# Patient Record
Sex: FEMALE | Race: WHITE | NOT HISPANIC OR LATINO | Employment: PART TIME | URBAN - METROPOLITAN AREA
[De-identification: names, ages, dates, MRNs, and addresses within clinical notes are randomized per-mention and may not be internally consistent; named-entity substitution may affect disease eponyms.]

---

## 2021-06-21 ENCOUNTER — HOSPITAL ENCOUNTER (EMERGENCY)
Facility: HOSPITAL | Age: 21
Discharge: HOME | End: 2021-06-21
Attending: EMERGENCY MEDICINE | Admitting: EMERGENCY MEDICINE

## 2021-06-21 ENCOUNTER — APPOINTMENT (OUTPATIENT)
Dept: GENERAL RADIOLOGY | Facility: HOSPITAL | Age: 21
End: 2021-06-21

## 2021-06-21 VITALS
HEART RATE: 99 BPM | DIASTOLIC BLOOD PRESSURE: 78 MMHG | TEMPERATURE: 98.1 F | HEIGHT: 62 IN | WEIGHT: 190.26 LBS | OXYGEN SATURATION: 99 % | SYSTOLIC BLOOD PRESSURE: 117 MMHG | BODY MASS INDEX: 35.01 KG/M2 | RESPIRATION RATE: 16 BRPM

## 2021-06-21 DIAGNOSIS — S61.209A AVULSION OF FINGER, INITIAL ENCOUNTER: Primary | ICD-10-CM

## 2021-06-21 PROCEDURE — 73140 X-RAY EXAM OF FINGER(S): CPT

## 2021-06-21 PROCEDURE — 99283 EMERGENCY DEPT VISIT LOW MDM: CPT

## 2021-06-21 PROCEDURE — 96372 THER/PROPH/DIAG INJ SC/IM: CPT

## 2021-06-21 PROCEDURE — 25010000003 CEFAZOLIN PER 500 MG: Performed by: EMERGENCY MEDICINE

## 2021-06-21 RX ORDER — CEFAZOLIN SODIUM 1 G/3ML
1 INJECTION, POWDER, FOR SOLUTION INTRAMUSCULAR; INTRAVENOUS ONCE
Status: COMPLETED | OUTPATIENT
Start: 2021-06-21 | End: 2021-06-21

## 2021-06-21 RX ORDER — CEFADROXIL 500 MG/1
500 CAPSULE ORAL 2 TIMES DAILY
Qty: 20 CAPSULE | Refills: 0 | Status: SHIPPED | OUTPATIENT
Start: 2021-06-21

## 2021-06-21 RX ORDER — WATER 1000 ML/1000ML
INJECTION, SOLUTION INTRAVENOUS
Status: COMPLETED
Start: 2021-06-21 | End: 2021-06-21

## 2021-06-21 RX ORDER — HYDROCODONE BITARTRATE AND ACETAMINOPHEN 5; 325 MG/1; MG/1
1 TABLET ORAL EVERY 6 HOURS PRN
Qty: 12 TABLET | Refills: 0 | Status: SHIPPED | OUTPATIENT
Start: 2021-06-21

## 2021-06-21 RX ORDER — HYDROCODONE BITARTRATE AND ACETAMINOPHEN 5; 325 MG/1; MG/1
1 TABLET ORAL ONCE AS NEEDED
Status: DISCONTINUED | OUTPATIENT
Start: 2021-06-21 | End: 2021-06-21 | Stop reason: HOSPADM

## 2021-06-21 RX ADMIN — CEFAZOLIN SODIUM 1 G: 1 INJECTION, POWDER, FOR SOLUTION INTRAMUSCULAR; INTRAVENOUS at 10:50

## 2021-06-21 RX ADMIN — WATER: 1 INJECTION INTRAMUSCULAR; INTRAVENOUS; SUBCUTANEOUS at 10:52

## 2021-06-21 NOTE — ED PROVIDER NOTES
Subjective   Chief complaint: Patient is a pleasant 20-year-old female.  She cannot remember her last tetanus.  She states that she was working at a [employer] company.  She had her hand prepared to  a piece of wood.  Somebody else was trying to pick it up as well.  They pushed it forward.  Smashed her finger between another piece of lumber and the one that she was trying to .  I tore skin from her finger.  She presented here.  She states that she is in some mild discomfort.  She states where her finger is severed is numb.  Otherwise she has been able to move it appropriately.  She is right-handed    Context: As above    Duration: 45 minutes prior to arrival    Timing: Sudden onset    Severity: Pain is not severe currently    Associated Symptoms: Negative except as noted above.          PCP:  LMP:          Review of Systems   Musculoskeletal:        Finger injury     Skin: Positive for wound.   Neurological: Positive for numbness.       No past medical history on file.    No Known Allergies    No past surgical history on file.    No family history on file.             Objective   Physical Exam  Vitals and nursing note reviewed.   Constitutional:       Appearance: Normal appearance.   HENT:      Head: Normocephalic and atraumatic.      Mouth/Throat:      Mouth: Mucous membranes are moist.   Eyes:      Extraocular Movements: Extraocular movements intact.   Cardiovascular:      Rate and Rhythm: Normal rate and regular rhythm.   Musculoskeletal:      Cervical back: Normal range of motion.      Comments: See scanned report.  Patient has amputated the distal medial aspect of her fifth digit.  She has bone exposed.  She can flex and extend appropriately.  Area of injury is 9.  The remainder of the finger is neurovascularly intact.   Skin:     Capillary Refill: Capillary refill takes less than 2 seconds.      Comments: The left medial aspect of the distal fifth digit is avulsed and amputated.  Bone is exposed.   Patient can flex and extend fully the finger.  Remainder fingers neurovascularly intact.   Neurological:      General: No focal deficit present.      Mental Status: She is alert.   Psychiatric:         Mood and Affect: Mood normal.         Thought Content: Thought content normal.         Procedures           ED Course  ED Course as of Jun 21 1221   Mon Jun 21, 2021   1220 Inspect report was run    [LH]      ED Course User Index  [LH] Aram Sanchez,           XR Finger 2+ View Left    Result Date: 6/21/2021   1. No acute fracture or dislocation. 2. Large area of soft tissue avulsion of the tip of the left pinky finger with exposure of the tuft of the distal phalanx.  Electronically Signed By-Juan Antonio Del Toro MD On:6/21/2021 9:45 AM This report was finalized on [id number] by  Juan Antonio Del Toro MD.                                      MDM  Number of Diagnoses or Management Options  Avulsion of finger, initial encounter  Diagnosis management comments: Patient had crush injury avulsion to her finger.  I discussed the case with  and he states that the amputation portion cannot be spared.  Patient will follow up in the office.  He would prefer to manage nonoperatively.  Patient was irrigated and cleansed.  Digital block was done to anesthetize the finger for cleansing.  Copious cleansing per staff with sterile saline and wound cleanser.  Patient had persistent bleeding and oozing.  Surgicel had to be applied.  Patient was given a dose of antibiotics here.  Tetanus was updated.  Patient will be wrapped with Adaptic and bulky dressing.  Will be given antibiotics for home and pain medicine.  She requested pain meds.  At this point time patient will be seen Wednesday by Dr. Milan in the office.  Inspect report was run.       Amount and/or Complexity of Data Reviewed  Tests in the radiology section of CPT®: reviewed  Discussion of test results with the performing providers: yes  Discuss the patient with other  providers: yes  Independent visualization of images, tracings, or specimens: yes    Risk of Complications, Morbidity, and/or Mortality  Management options: high    Patient Progress  Patient progress: stable      Final diagnoses:   None     Left fifth digit partial amputation distal phalanx  ED Disposition  ED Disposition     None          No follow-up provider specified.       Medication List      No changes were made to your prescriptions during this visit.          Aram Sanchez,   06/21/21 1155       Aram Sanchez,   06/21/21 1221

## 2025-05-30 ENCOUNTER — HOSPITAL ENCOUNTER (EMERGENCY)
Facility: HOSPITAL | Age: 25
Discharge: HOME | End: 2025-05-30
Payer: MEDICAID

## 2025-05-30 VITALS
OXYGEN SATURATION: 100 % | BODY MASS INDEX: 40.52 KG/M2 | WEIGHT: 220.2 LBS | RESPIRATION RATE: 17 BRPM | DIASTOLIC BLOOD PRESSURE: 83 MMHG | TEMPERATURE: 98 F | HEART RATE: 100 BPM | HEIGHT: 62 IN | SYSTOLIC BLOOD PRESSURE: 131 MMHG

## 2025-05-30 DIAGNOSIS — Z20.2 ENCOUNTER FOR ASSESSMENT OF STD EXPOSURE: ICD-10-CM

## 2025-05-30 DIAGNOSIS — R10.2 VAGINAL PAIN: Primary | ICD-10-CM

## 2025-05-30 LAB
B-HCG UR QL: NEGATIVE
BACTERIA UR QL AUTO: ABNORMAL /HPF
BILIRUB UR QL STRIP: NEGATIVE
C TRACH DNA SPEC QL NAA+PROBE: NOT DETECTED
CLARITY UR: CLEAR
CLUE CELLS SPEC QL WET PREP: ABNORMAL
COLOR UR: YELLOW
GLUCOSE UR STRIP-MCNC: NEGATIVE MG/DL
HAV IGM SERPL QL IA: NORMAL
HBV CORE IGM SERPL QL IA: NORMAL
HBV SURFACE AG SERPL QL IA: NORMAL
HCV AB SER QL: NORMAL
HGB UR QL STRIP.AUTO: ABNORMAL
HIV 1+2 AB+HIV1 P24 AG SERPL QL IA: NORMAL
HYALINE CASTS UR QL AUTO: ABNORMAL /LPF
HYDATID CYST SPEC WET PREP: ABNORMAL
KETONES UR QL STRIP: NEGATIVE
LEUKOCYTE ESTERASE UR QL STRIP.AUTO: ABNORMAL
N GONORRHOEA RRNA SPEC QL NAA+PROBE: NOT DETECTED
NITRITE UR QL STRIP: NEGATIVE
PH UR STRIP.AUTO: 5.5 [PH] (ref 5–8)
PROT UR QL STRIP: ABNORMAL
RBC # UR STRIP: ABNORMAL /HPF
REF LAB TEST METHOD: ABNORMAL
SP GR UR STRIP: 1.01 (ref 1–1.03)
SQUAMOUS #/AREA URNS HPF: ABNORMAL /HPF
T VAGINALIS SPEC QL WET PREP: ABNORMAL
UROBILINOGEN UR QL STRIP: ABNORMAL
WBC # UR STRIP: ABNORMAL /HPF
WBC SPEC QL WET PREP: ABNORMAL
YEAST GENITAL QL WET PREP: ABNORMAL

## 2025-05-30 PROCEDURE — 81025 URINE PREGNANCY TEST: CPT | Performed by: NURSE PRACTITIONER

## 2025-05-30 PROCEDURE — 87491 CHLMYD TRACH DNA AMP PROBE: CPT | Performed by: NURSE PRACTITIONER

## 2025-05-30 PROCEDURE — 25010000002 LIDOCAINE PF 1% 1 % SOLUTION 5 ML VIAL: Performed by: NURSE PRACTITIONER

## 2025-05-30 PROCEDURE — G0432 EIA HIV-1/HIV-2 SCREEN: HCPCS | Performed by: NURSE PRACTITIONER

## 2025-05-30 PROCEDURE — 99284 EMERGENCY DEPT VISIT MOD MDM: CPT

## 2025-05-30 PROCEDURE — 63710000001 ONDANSETRON ODT 4 MG TABLET DISPERSIBLE: Performed by: NURSE PRACTITIONER

## 2025-05-30 PROCEDURE — 25010000002 CEFTRIAXONE PER 250 MG: Performed by: NURSE PRACTITIONER

## 2025-05-30 PROCEDURE — 36415 COLL VENOUS BLD VENIPUNCTURE: CPT

## 2025-05-30 PROCEDURE — 87255 GENET VIRUS ISOLATE HSV: CPT | Performed by: NURSE PRACTITIONER

## 2025-05-30 PROCEDURE — 80074 ACUTE HEPATITIS PANEL: CPT | Performed by: NURSE PRACTITIONER

## 2025-05-30 PROCEDURE — 87210 SMEAR WET MOUNT SALINE/INK: CPT | Performed by: NURSE PRACTITIONER

## 2025-05-30 PROCEDURE — 87591 N.GONORRHOEAE DNA AMP PROB: CPT | Performed by: NURSE PRACTITIONER

## 2025-05-30 PROCEDURE — 96372 THER/PROPH/DIAG INJ SC/IM: CPT

## 2025-05-30 PROCEDURE — 81001 URINALYSIS AUTO W/SCOPE: CPT | Performed by: NURSE PRACTITIONER

## 2025-05-30 RX ORDER — ONDANSETRON 4 MG/1
4 TABLET, ORALLY DISINTEGRATING ORAL ONCE
Status: COMPLETED | OUTPATIENT
Start: 2025-05-30 | End: 2025-05-30

## 2025-05-30 RX ORDER — LIDOCAINE 50 MG/G
1 OINTMENT TOPICAL AS NEEDED
Status: DISCONTINUED | OUTPATIENT
Start: 2025-05-30 | End: 2025-05-31 | Stop reason: HOSPADM

## 2025-05-30 RX ORDER — DOXYCYCLINE 100 MG/1
100 CAPSULE ORAL 2 TIMES DAILY
Qty: 14 CAPSULE | Refills: 0 | Status: SHIPPED | OUTPATIENT
Start: 2025-05-30 | End: 2025-06-06

## 2025-05-30 RX ORDER — LIDOCAINE 50 MG/G
1 OINTMENT TOPICAL
Qty: 30 G | Refills: 0 | Status: SHIPPED | OUTPATIENT
Start: 2025-05-30 | End: 2025-06-06

## 2025-05-30 RX ORDER — VALACYCLOVIR HYDROCHLORIDE 500 MG/1
1000 TABLET, FILM COATED ORAL ONCE
Status: COMPLETED | OUTPATIENT
Start: 2025-05-30 | End: 2025-05-30

## 2025-05-30 RX ORDER — VALACYCLOVIR HYDROCHLORIDE 1 G/1
1000 TABLET, FILM COATED ORAL 3 TIMES DAILY
Qty: 21 TABLET | Refills: 0 | Status: SHIPPED | OUTPATIENT
Start: 2025-05-30 | End: 2025-06-06

## 2025-05-30 RX ORDER — DOXYCYCLINE 100 MG/1
100 CAPSULE ORAL ONCE
Status: COMPLETED | OUTPATIENT
Start: 2025-05-30 | End: 2025-05-30

## 2025-05-30 RX ADMIN — VALACYCLOVIR 1000 MG: 500 TABLET, FILM COATED ORAL at 22:01

## 2025-05-30 RX ADMIN — LIDOCAINE HYDROCHLORIDE 1 G: 10 INJECTION, SOLUTION EPIDURAL; INFILTRATION; INTRACAUDAL; PERINEURAL at 21:01

## 2025-05-30 RX ADMIN — DOXYCYCLINE 100 MG: 100 CAPSULE ORAL at 21:02

## 2025-05-30 RX ADMIN — LIDOCAINE 1 APPLICATION: 50 OINTMENT TOPICAL at 22:01

## 2025-05-30 RX ADMIN — ONDANSETRON 4 MG: 4 TABLET, ORALLY DISINTEGRATING ORAL at 21:02

## 2025-05-30 NOTE — ED PROVIDER NOTES
Subjective   Chief Complaint   Patient presents with    Exposure to STD       History of Present Illness  Patient is a 24-year-old female presents the ED for evaluation of possible STD exposure, vaginal pain.  She reports she last had sexual intercourse on Tuesday, it was unprotected, she would like testing for STDs, including HIV and hepatitis.  Patient reports that she has had 2 periods this month.  She reports that she may have sustained a laceration during intercourse, but she is unsure  Unsure of any vaginal discharge.  No pelvic pain or abdominal pain  Review of Systems    No past medical history on file.    No Known Allergies    No past surgical history on file.    No family history on file.    Social History     Socioeconomic History    Marital status: Single           Objective   Physical Exam  Vitals and nursing note reviewed. Exam conducted with a chaperone present (Alondra AVILA).   Constitutional:       Appearance: Normal appearance. She is not ill-appearing or toxic-appearing.   HENT:      Head: Normocephalic and atraumatic.      Nose: Nose normal.      Mouth/Throat:      Mouth: Mucous membranes are moist.      Pharynx: Oropharynx is clear.   Eyes:      Conjunctiva/sclera: Conjunctivae normal.      Pupils: Pupils are equal, round, and reactive to light.   Cardiovascular:      Rate and Rhythm: Normal rate and regular rhythm.      Heart sounds: Normal heart sounds. No murmur heard.     No friction rub. No gallop.   Pulmonary:      Effort: Pulmonary effort is normal.      Breath sounds: Normal breath sounds.   Abdominal:      General: Bowel sounds are normal.      Palpations: Abdomen is soft.      Tenderness: There is no abdominal tenderness. There is no guarding or rebound.   Genitourinary:         Comments: She was placed in the lithotomy position. External exam as above. Speculum exam shows closed cervix, Scant blood in vaginal vault . The patient had no cervical motion tenderness.  Patient had no adnexal  tenderness.  The patient had cultures obtained and her exam was performed withAlison RN at the bedside.  Musculoskeletal:         General: Normal range of motion.      Cervical back: Normal range of motion and neck supple.      Right lower leg: No edema.      Left lower leg: No edema.   Skin:     General: Skin is warm and dry.      Capillary Refill: Capillary refill takes less than 2 seconds.      Findings: No rash.   Neurological:      General: No focal deficit present.      Mental Status: She is alert and oriented to person, place, and time.   Psychiatric:         Mood and Affect: Mood normal.         Behavior: Behavior normal.         Procedures           ED Course  ED Course as of 05/30/25 2109   Fri May 30, 2025   2037 Asked lab to urine Urine preg.  [LB]   2101 Blood, UA(!): Moderate (2+)   Pt on menstrual cycle   [LB]      ED Course User Index  [LB] Rhonda Mittal, DANNY                                                       Medical Decision Making  Problems Addressed:  Encounter for assessment of STD exposure: complicated acute illness or injury  Vaginal pain: complicated acute illness or injury    Amount and/or Complexity of Data Reviewed  Labs: ordered.    Risk  Prescription drug management.    Appropriate PPE worn during patient interactions.    Patient with above exam and workup.  I discussed with her with sensation completed today would not be back tonight.    Exam patient does have area with ulcerations with erythematous base, pain is concerning for HSV.  I see no lacerations or tears at this point.     Extensive conversation with the patient regarding education, notification of partners, treatment.  Patient would like to proceed with Rocephin, doxycycline, Valtrex.    We discussed my findings, plan of care, discharge instructions, the importance of follow up with their PCP/ and or specialist for repeat evaluation and to discuss any abnormal findings in labs or imaging that warrant further  outpatient evaluation. We discussed that although a definitive diagnosis is not always found in the ED, it is believed emergent conditions have been ruled out, and patient is safe for discharge at this time.  We discussed return precautions for the emergency department.  Patient verbalizes understandings, and agrees with current plan of care.      Note Disclaimer: At [facility], we believe that sharing information builds trust and better relationships. You are receiving this note because you recently visited [facility]. It is possible you will see health information before a provider has talked with you about it. This kind of information can be easy to misunderstand. To help you fully understand what it means for your health, we urge you to discuss this note with your provider  Note dictated utilizing Dragon Dictation.          Final diagnoses:   Vaginal pain   Encounter for assessment of STD exposure       ED Disposition  ED Disposition       ED Disposition   Discharge    Condition   Stable    Comment   --               [facility] EMERGENCY DEPARTMENT  [address] [id number] [phone]        PATIENT CONNECTION - [place] [id number]  [phone]  Schedule an appointment as soon as possible for a visit   Call for assistance with follow up with Primary care provider-call tomorrow.    [facility]  [phone]  [facility]  [address]  [place] [address]             Medication List        New Prescriptions      doxycycline 100 MG capsule  Commonly known as: MONODOX  Take 1 capsule by mouth 2 (Two) Times a Day for 7 days.     lidocaine 5 % ointment  Commonly known as: XYLOCAINE  Apply 1 Application topically to the appropriate area as directed Every 2 (Two) Hours As Needed for Moderate Pain for up to 7 days.     valACYclovir 1000 MG tablet  Commonly known as: VALTREX  Take 1 tablet by mouth 3 (Three) Times a Day for 7 days.                Where to Get Your Medications        These medications were sent to [facility]/pharmacy #[id number] - [place], [place] - [address] - [phone]  - [phone] [address] [place] [address]      Hours: 24-hours Phone: [phone]   doxycycline 100 MG capsule  lidocaine 5 % ointment  valACYclovir 1000 MG tablet

## 2025-05-31 NOTE — DISCHARGE INSTRUCTIONS
Follow up with PCP and OB/GYN call for an appointment  Return to ed for new or worsening symptoms  Notify sexual partners  No sexual contact until clearing of lesions and at least 10 days

## 2025-06-03 ENCOUNTER — RESULTS FOLLOW-UP (OUTPATIENT)
Dept: EMERGENCY DEPT | Facility: HOSPITAL | Age: 25
End: 2025-06-03
Payer: COMMERCIAL

## 2025-06-03 LAB — HSV SPEC CULT: POSITIVE

## 2025-06-03 RX ORDER — LIDOCAINE 50 MG/G
1 OINTMENT TOPICAL
Qty: 30 G | Refills: 0 | Status: SHIPPED | OUTPATIENT
Start: 2025-06-03 | End: 2025-06-10

## 2025-06-03 RX ORDER — VALACYCLOVIR HYDROCHLORIDE 1 G/1
1000 TABLET, FILM COATED ORAL 3 TIMES DAILY
Qty: 21 TABLET | Refills: 0 | Status: SHIPPED | OUTPATIENT
Start: 2025-06-03 | End: 2025-06-10

## 2025-06-03 NOTE — PROGRESS NOTES
Preliminary vaginal culture resulted with HSV. Patient was given Rx for valacyclovir. Chlamydia and gonorrhea were negative, HIV and hepatitis negative. Attempted to reach patient to notify her of results. Patient returned call. Rx sent to [facility] per patient request to be able to use GoodRx coupon. Patient counseled. No further follow-up required.     Microbiology Results (last 10 days)       Procedure Component Value - Date/Time    Herpes Simplex Virus Culture - Swab, Vagina [[id number]]  (Abnormal) Collected: 05/30/25 2049    Lab Status: Final result Specimen: Swab from Vagina Updated: 06/03/25 1612     HSV Culture Without Typing Positive    Narrative:      Performed at:  [address]  [id number]  : Gerry Rojo PhD, Phone:  [phone]    Wet Prep, Genital - Swab, Vagina [[id number]]  (Abnormal) Collected: 05/30/25 2002    Lab Status: Final result Specimen: Swab from Vagina Updated: 05/30/25 2008     YEAST No yeast seen     HYPHAL ELEMENTS No Hyphal elements seen     WBC'S 2+ WBC's seen     Clue Cells, Wet Prep No Clue cells seen     Trichomonas, Wet Prep No Trichomonas seen    Chlamydia trachomatis, Neisseria gonorrhoeae, PCR - Urine, Urine, Random Void [[id number]]  (Normal) Collected: 05/30/25 1934    Lab Status: Final result Specimen: Urine, Random Void Updated: 05/30/25 2110     Chlamydia by PCR Not Detected     Neisseria gonorrhoeae by PCR Not Detected            Katie Dang [facility]  6/3/2025 17:01 EDT

## 2025-06-03 NOTE — PROGRESS NOTES
Preliminary vaginal culture resulted with HSV. Patient was given Rx for valacyclovir. Chlamydia and gonorrhea were negative, HIV and hepatitis negative. Attempted to reach patient to notify her of results. No answer, LVM for return call to ER pharmacy.     Microbiology Results (last 10 days)       Procedure Component Value - Date/Time    Herpes Simplex Virus Culture - Swab, Vagina [[id number]]  (Abnormal) Collected: 05/30/25 2049    Lab Status: Final result Specimen: Swab from Vagina Updated: 06/03/25 1612     HSV Culture Without Typing Positive    Narrative:      Performed at:  [address] [place] [address]  [id number]  : Gerry Rojo PhD, Phone:  [phone]    Wet Prep, Genital - Swab, Vagina [[id number]]  (Abnormal) Collected: 05/30/25 2002    Lab Status: Final result Specimen: Swab from Vagina Updated: 05/30/25 2008     YEAST No yeast seen     HYPHAL ELEMENTS No Hyphal elements seen     WBC'S 2+ WBC's seen     Clue Cells, Wet Prep No Clue cells seen     Trichomonas, Wet Prep No Trichomonas seen    Chlamydia trachomatis, Neisseria gonorrhoeae, PCR - Urine, Urine, Random Void [[id number]]  (Normal) Collected: 05/30/25 1934    Lab Status: Final result Specimen: Urine, Random Void Updated: 05/30/25 2110     Chlamydia by PCR Not Detected     Neisseria gonorrhoeae by PCR Not Detected            Katie Dang [facility]  6/3/2025 16:37 EDT